# Patient Record
Sex: MALE | Race: WHITE | URBAN - METROPOLITAN AREA
[De-identification: names, ages, dates, MRNs, and addresses within clinical notes are randomized per-mention and may not be internally consistent; named-entity substitution may affect disease eponyms.]

---

## 2019-01-14 ENCOUNTER — OFFICE VISIT (OUTPATIENT)
Dept: INTERNAL MEDICINE CLINIC | Facility: CLINIC | Age: 29
End: 2019-01-14
Payer: COMMERCIAL

## 2019-01-14 VITALS
HEIGHT: 69 IN | WEIGHT: 210 LBS | BODY MASS INDEX: 31.1 KG/M2 | SYSTOLIC BLOOD PRESSURE: 144 MMHG | HEART RATE: 112 BPM | TEMPERATURE: 99 F | DIASTOLIC BLOOD PRESSURE: 84 MMHG

## 2019-01-14 DIAGNOSIS — J02.0 PHARYNGITIS DUE TO GROUP A BETA HEMOLYTIC STREPTOCOCCI: Primary | ICD-10-CM

## 2019-01-14 LAB
CONTROL LINE PRESENT WITH A CLEAR BACKGROUND (YES/NO): YES YES/NO
KIT LOT #: NORMAL NUMERIC

## 2019-01-14 PROCEDURE — 99202 OFFICE O/P NEW SF 15 MIN: CPT | Performed by: INTERNAL MEDICINE

## 2019-01-14 PROCEDURE — 99212 OFFICE O/P EST SF 10 MIN: CPT | Performed by: INTERNAL MEDICINE

## 2019-01-14 PROCEDURE — 87880 STREP A ASSAY W/OPTIC: CPT | Performed by: INTERNAL MEDICINE

## 2019-01-14 RX ORDER — AMOXICILLIN 500 MG/1
500 CAPSULE ORAL 2 TIMES DAILY
Qty: 20 CAPSULE | Refills: 0 | Status: SHIPPED | OUTPATIENT
Start: 2019-01-14 | End: 2019-01-24

## 2019-01-14 NOTE — PATIENT INSTRUCTIONS
Pharyngitis: Strep (Confirmed)    You have had a positive test for strep throat. Strep throat is a contagious illness. It is spread by coughing, kissing or by touching others after touching your mouth or nose.  Symptoms include throat pain that is worse w · Lymph nodes getting larger or becoming soft in the middle  · You can't swallow liquids or you can't open your mouth wide because of throat pain  · Signs of dehydration. These include very dark urine or no urine, sunken eyes, and dizziness.   · Trouble renuka A medical evaluation can help find the cause of your sore throat. It can also help your healthcare provider choose the best treatment for you. The evaluation may include a health history, physical exam, and diagnostic tests.   Health history  Your healthcar · Gargle with warm saltwater (1 teaspoon of salt to 8 ounces of warm water). · Use a humidifier to keep air moist and relieve throat dryness. · Try over-the-counter pain relievers such as acetaminophen or ibuprofen.  Use as directed, and don’t exceed the · A skin rash, hives, or wheezing develops. Any of these could signal an allergic reaction to antibiotics. · Symptoms don’t improve within a week. · Symptoms don’t improve within 2 to 3 days of starting antibiotics.    Date Last Reviewed: 10/1/2016  © 200

## 2019-01-14 NOTE — PROGRESS NOTES
HPI:    Patient ID: Charlotte Eliznodo is a 30year old male. Chief Complaint: Sore Throat (Pt has chills, sore throat, sinus pain, body aches, started yesterday, took dayquil)      Sore Throat    This is a new problem. The current episode started yesterday.  Maggy Garcia Gastrointestinal: Negative for vomiting, abdominal pain and diarrhea. Musculoskeletal: Negative for neck pain. Neurological: Positive for headaches.          Physical Exam:      01/14/19  1359   BP: 144/84   Pulse: 112   Temp: 99 °F (37.2 °C)   TempSrc: -     amoxicillin 500 MG Oral Cap; Take 1 capsule (500 mg total) by mouth 2 (two) times daily for 10 days. Plan:  Rapid strep positive, treatment as above, salt water gargles/chicken broth, honey. Call office if worsening.  Instructed to seek emergency · No work or school for the first 2 days of taking the antibiotics. After this time, you will not be contagious. You can then return to school or work if you are feeling better.   · Take antibiotic medicine for the full 10 days, even if you feel better.  Armand Hazel · Don’t smoke, and stay away from secondhand smoke. Date Last Reviewed: 11/1/2017  © 6617-4973 [employer]. [address] [facility] [place], [facility] [place]. All rights reserved.  This information is not intended as a substitute for professional med · Does your sore throat recur? If so, how often? How many days of school or work have you missed because of a sore throat? · Do you have trouble eating or swallowing? · Have you been told that you snore or have other sleep problems?   · Do you have bad br If your sore throat is due to a bacterial infection, antibiotics may speed healing and prevent complications.  Although group A streptococcus (\"strep throat\" or GAS) is the major treatable infection for a sore throat, GAS causes only 5% to 15% of sore thr © 5635-5620 [employer]. [address] [facility] [place] [facility] [place]. All rights reserved. This information is not intended as a substitute for professional medical care. Always follow your healthcare professional's instructions.

## (undated) NOTE — LETTER
1/14/2019          To Whom It May Concern:    Carlos Mckeon is currently under my medical care and may not return to work at this time. Please excuse Bobby Link for 3 days. He may return to work on 1/18/19. Activity is restricted as follows: none.     If yo